# Patient Record
Sex: FEMALE | Race: WHITE | NOT HISPANIC OR LATINO | Employment: UNEMPLOYED | URBAN - METROPOLITAN AREA
[De-identification: names, ages, dates, MRNs, and addresses within clinical notes are randomized per-mention and may not be internally consistent; named-entity substitution may affect disease eponyms.]

---

## 2017-02-15 ENCOUNTER — OFFICE VISIT (OUTPATIENT)
Dept: INTERNAL MEDICINE | Facility: CLINIC | Age: 42
End: 2017-02-15

## 2017-02-15 DIAGNOSIS — R45.1 AGITATION: Primary | ICD-10-CM

## 2017-02-15 PROCEDURE — 99212 OFFICE O/P EST SF 10 MIN: CPT | Performed by: NURSE PRACTITIONER

## 2017-02-15 NOTE — PROGRESS NOTES
Subjective  No chief complaint on file.  pt would like for her medical records to be ammended    Loretta Gunter is a 41 y.o. female.   Allergies   Allergen Reactions   • Amoxicillin Diarrhea     History of Present Illness      Pt is here with another adult today who is speaking for the pt because pt states she has a headache, is has on dark sunglasses and laying on exam table. They have brought in several past visits going back to 2014 that they would like corrected, pt states she remembers how she felt on all those days and would like amended . I reached out to Margot Nicholson  Since she is the , she will document as well on the converstaition after I had left the room   I did explain to mom and pt that I was unable to change any notes from any doctor from any time period     Review of Systems  Not performed  Objective   Physical Exam   Constitutional: She is oriented to person, place, and time. She appears well-developed and well-nourished.   HENT:   Head: Normocephalic and atraumatic.   Neurological: She is alert and oriented to person, place, and time.   Psychiatric: Her speech is normal. Judgment and thought content normal. Her affect is angry and inappropriate. She is agitated and aggressive. Cognition and memory are normal.       Assessment/Plan     Problem List Items Addressed This Visit        Other    Agitation - Primary     Pt and her mother did speak with me and Margot Nicholson regarding changes to notes, please refer to other notes made by Margot Nicholson

## 2017-02-15 NOTE — ASSESSMENT & PLAN NOTE
Pt and her mother did speak with me and Margot Nicholson regarding changes to notes, please refer to other notes made by Margot Nicholson

## 2023-03-01 RX ORDER — BUDESONIDE AND FORMOTEROL FUMARATE DIHYDRATE 160; 4.5 UG/1; UG/1
AEROSOL RESPIRATORY (INHALATION)
Qty: 10.2 G | Refills: 11 | OUTPATIENT
Start: 2023-03-01

## 2025-07-09 ENCOUNTER — HOSPITAL ENCOUNTER (EMERGENCY)
Facility: HOSPITAL | Age: 50
Discharge: HOME | End: 2025-07-09
Attending: EMERGENCY MEDICINE | Admitting: EMERGENCY MEDICINE
Payer: COMMERCIAL

## 2025-07-09 VITALS
DIASTOLIC BLOOD PRESSURE: 75 MMHG | OXYGEN SATURATION: 100 % | HEIGHT: 70 IN | TEMPERATURE: 98.7 F | HEART RATE: 109 BPM | WEIGHT: 180 LBS | BODY MASS INDEX: 25.77 KG/M2 | RESPIRATION RATE: 20 BRPM | SYSTOLIC BLOOD PRESSURE: 128 MMHG

## 2025-07-09 DIAGNOSIS — Z20.3 RABIES EXPOSURE: Primary | ICD-10-CM

## 2025-07-09 PROCEDURE — 99282 EMERGENCY DEPT VISIT SF MDM: CPT

## 2025-07-09 PROCEDURE — 25010000002 RABIES VACCINE PER 1 ML: Performed by: NURSE PRACTITIONER

## 2025-07-09 PROCEDURE — 90471 IMMUNIZATION ADMIN: CPT | Performed by: NURSE PRACTITIONER

## 2025-07-09 PROCEDURE — 25010000002 RABIES IMMUNE GLOBULIN PER VIAL: Performed by: NURSE PRACTITIONER

## 2025-07-09 PROCEDURE — 90675 RABIES VACCINE IM: CPT | Performed by: NURSE PRACTITIONER

## 2025-07-09 PROCEDURE — 96372 THER/PROPH/DIAG INJ SC/IM: CPT

## 2025-07-09 PROCEDURE — 90375 RABIES IG IM/SC: CPT | Performed by: NURSE PRACTITIONER

## 2025-07-09 RX ADMIN — RABIES IMMUNE GLOBULIN (HUMAN) 1500 UNITS: 300 INJECTION, SOLUTION INFILTRATION; INTRAMUSCULAR at 21:53

## 2025-07-09 RX ADMIN — Medication 2.5 UNITS: at 21:54

## 2025-07-10 NOTE — ED PROVIDER NOTES
Subjective   History of Present Illness  Pleasant patient who was exposed to the blood of a groundhog today after her dog attacked it.  She was looking through her dog's mouth as her dog had broken a tooth and may have gotten the groundhog's blood on her face and in her saliva.  She did get it on her hands.  She is very concerned about rabies as she tells me the groundhog was acting strange.  She did show me a video of a groundhog fighting a dog that appeared to be backed into a corner.  She tells me that the groundhog was bleeding quite a bit.  At 1 point they were trying to feed the groundhog and it essentially just walked away presumably to die.  She had to take her dog to the emergency vet related to the blood in concern for rabies exposure and broken tooth.        Review of Systems    Past Medical History:   Diagnosis Date    Allergic rhinitis     Asthma     Dyslipidemia     Hypothyroidism     On replacement therapy.    Migraine     Tachycardia     resting tachy  per cardiologist       Allergies   Allergen Reactions    Amoxicillin Diarrhea       Past Surgical History:   Procedure Laterality Date    LIPOMA EXCISION         Family History   Problem Relation Age of Onset    Arthritis Mother     Hyperlipidemia Mother     Hypertension Father     Arthritis Father     Cancer Other     Brain cancer Other     Cancer Other     Colon cancer Other     Migraines Other     Thyroid disease Other        Social History     Socioeconomic History    Marital status: Single   Tobacco Use    Smoking status: Former     Current packs/day: 0.00     Average packs/day: 0.5 packs/day for 5.0 years (2.5 ttl pk-yrs)     Types: Cigarettes     Start date:      Quit date:      Years since quittin.5    Smokeless tobacco: Never    Tobacco comments:     Former smoker of half ppd for 5 years, quit in .   Substance and Sexual Activity    Alcohol use: Yes    Drug use: No    Sexual activity: Defer     Comment: Single            Objective   Physical Exam  Constitutional:       Appearance: She is well-developed.   HENT:      Head: Normocephalic and atraumatic.      Right Ear: External ear normal.      Left Ear: External ear normal.      Nose: Nose normal.   Eyes:      Conjunctiva/sclera: Conjunctivae normal.      Pupils: Pupils are equal, round, and reactive to light.   Cardiovascular:      Rate and Rhythm: Normal rate and regular rhythm.      Heart sounds: Normal heart sounds.   Pulmonary:      Effort: Pulmonary effort is normal.      Breath sounds: Normal breath sounds.   Abdominal:      General: Bowel sounds are normal.      Palpations: Abdomen is soft.   Musculoskeletal:         General: Normal range of motion.      Cervical back: Normal range of motion and neck supple.   Skin:     General: Skin is warm and dry.   Neurological:      Mental Status: She is alert and oriented to person, place, and time.   Psychiatric:         Behavior: Behavior normal.         Thought Content: Thought content normal.         Judgment: Judgment normal.         Procedures           ED Course  ED Course as of 07/09/25 2217 Wed Jul 09, 2025 2125 The dates for her to return are 7/12, 7/16, 7/23 [JM]   2129 Patient is very worried about rabies.  She feels like she could get some blood in her mouth.  She does not think the animal bit her but she was messing with her dogs mouth and there was blood in her dog's mouth and the dog had a broken tooth as well.  She tells me she did have quite a bit of blood on her hands and it could have gotten on her face.  I did watch the video of the groundhog.  From my standpoint it did not look rabid it look like it was in a corner and it was fighting against the dog.  Nonetheless the patient is worried about rabies.  There is a possibility of contact with the blood to her mucous membranes and the patient very much wants to start the series. [JM]      ED Course User Index  [JM] Rg Guardado APRN                                                        Medical Decision Making  Problems Addressed:  Rabies exposure: complicated acute illness or injury    Risk  Prescription drug management.        Final diagnoses:   Rabies exposure       ED Disposition  ED Disposition       ED Disposition   Discharge    Condition   Stable    Comment   --               [facility] EMERGENCY DEPARTMENT  [address]  [facility] [id number] [phone]    You will need to return for repeat doses of your rabies vaccine on July 12,  July 16 and July 23.    Eric Jaimes MD  [address]  [place] [id number]  [phone]    Schedule an appointment as soon as possible for a visit            Medication List      No changes were made to your prescriptions during this visit.            Rg Guardado, RADHA  07/09/25 5484